# Patient Record
Sex: MALE | Race: WHITE | NOT HISPANIC OR LATINO | URBAN - METROPOLITAN AREA
[De-identification: names, ages, dates, MRNs, and addresses within clinical notes are randomized per-mention and may not be internally consistent; named-entity substitution may affect disease eponyms.]

---

## 2022-07-20 ENCOUNTER — HOSPITAL ENCOUNTER (EMERGENCY)
Facility: HOSPITAL | Age: 52
Discharge: HOME | End: 2022-07-20
Attending: EMERGENCY MEDICINE
Payer: COMMERCIAL

## 2022-07-20 VITALS
HEART RATE: 82 BPM | DIASTOLIC BLOOD PRESSURE: 68 MMHG | HEIGHT: 72 IN | OXYGEN SATURATION: 92 % | SYSTOLIC BLOOD PRESSURE: 125 MMHG | TEMPERATURE: 97.6 F | RESPIRATION RATE: 16 BRPM | WEIGHT: 190 LBS | BODY MASS INDEX: 25.73 KG/M2

## 2022-07-20 DIAGNOSIS — T40.1X1A ACCIDENTAL OVERDOSE OF HEROIN, INITIAL ENCOUNTER (HCC): Primary | ICD-10-CM

## 2022-07-20 LAB
ATRIAL RATE: 81 BPM
P AXIS: 81 DEGREES
PR INTERVAL: 156 MS
QRS AXIS: 72 DEGREES
QRSD INTERVAL: 90 MS
QT INTERVAL: 374 MS
QTC INTERVAL: 434 MS
T WAVE AXIS: 65 DEGREES
VENTRICULAR RATE: 81 BPM

## 2022-07-20 PROCEDURE — 93010 ELECTROCARDIOGRAM REPORT: CPT | Performed by: INTERNAL MEDICINE

## 2022-07-20 PROCEDURE — 93005 ELECTROCARDIOGRAM TRACING: CPT

## 2022-07-20 PROCEDURE — 99284 EMERGENCY DEPT VISIT MOD MDM: CPT

## 2022-07-20 PROCEDURE — 99284 EMERGENCY DEPT VISIT MOD MDM: CPT | Performed by: EMERGENCY MEDICINE

## 2022-07-20 RX ADMIN — NALOXONE HYDROCHLORIDE 4 MG: 4 SPRAY NASAL at 19:35

## 2022-07-20 NOTE — ED ATTENDING ATTESTATION
7/20/2022  Moisés Pedraza DO, saw and evaluated the patient  I have discussed the patient with the resident/non-physician practitioner and agree with the resident's/non-physician practitioner's findings, Plan of Care, and MDM as documented in the resident's/non-physician practitioner's note, except where noted  All available labs and Radiology studies were reviewed  I was present for key portions of any procedure(s) performed by the resident/non-physician practitioner and I was immediately available to provide assistance  At this point I agree with the current assessment done in the Emergency Department  I have conducted an independent evaluation of this patient a history and physical is as follows:    Patient presents via EMS after being found unresponsive at home by his family  He evidently had snorted heroin for which his family administered 4 mg of Narcan with reversal of his unresponsiveness  He was initially mildly hypoxic at 88% on room air for which he was given supplemental oxygen  He arrived awake, alert and oriented, just amnestic to the events after snorting the heroin  At the time of my interview, he had taken off the nasal cannula and was still saturating in the mid 90s on room air  His wife is present in the room and will accept replenishment Narcan  He initially declined it, stating this was the 1st time that he used an excessive amount her when or required Narcan  He does not seek rehab at this time, thinking he can overcome it himself  ROS: Denies preceding or contributing HA, syncope, neck or back pain, CP, SOB, abdominal pain  12 system ROS o/w negative       PE: NAD, appears comfortable, awake, cooperative, no external trauma; PERRL, EOMI; MMM, no posterior oropharyngeal exudate, edema or erythema; no midline vertebral TTP; HRR, no murmur, monitor shows sinus rhythm at 94 bpm; lungs CTA w/o w/r/r, POx 97% on RA (nl); abdomen s/nt/nd, nl BS in all 4 quadrants; (-) LE edema, FROM extremities x4, strength and sensation appear intact; skin p/w/d, no visible track marks; CN II-XII GI/NF, oriented  DDx:  Snorted heroin overdose w/o evidence of injury or persistent medical emergency  A/P: Will check EKG, monitor for worsening condition, dispense community Narcan package, reevaluate for further workup and disposition      ED Course         Critical Care Time  Procedures

## 2022-07-20 NOTE — ED PROVIDER NOTES
History  Chief Complaint   Patient presents with    Overdose - Accidental     Pt arrives via EMS from home, found unresponsive by family, given 4mg narcan, A&Ox4  Denies complaints  Patient is a 49-year-old male who presents following a heroin overdose  Patient says he snorted heroin at home and overdosed  Her patient and nursing notes patient was found by family  Patient was given 4 mg of Narcan and return to baseline  Patient states he started recreational drug use a few months ago when his injuries by friend, says he has snorted heroin, smoked marijuana but no other drug use, no IV drug use  He denies any symptoms at this time  No chest pain, shortness a breath, palpitations, fevers, chills  Patient denies rehab at this time  Patient states he will never do recreational drugs again  None       History reviewed  No pertinent past medical history  History reviewed  No pertinent surgical history  History reviewed  No pertinent family history  I have reviewed and agree with the history as documented  E-Cigarette/Vaping    E-Cigarette Use Never User      E-Cigarette/Vaping Substances     Social History     Tobacco Use    Smoking status: Current Every Day Smoker     Types: Cigarettes    Smokeless tobacco: Never Used   Vaping Use    Vaping Use: Never used   Substance Use Topics    Alcohol use: Never    Drug use: Yes     Types: Heroin        Review of Systems   Constitutional: Negative for chills and fever  HENT: Negative for ear pain and sore throat  Eyes: Negative for pain and visual disturbance  Respiratory: Negative for cough and shortness of breath  Cardiovascular: Negative for chest pain and palpitations  Gastrointestinal: Negative for abdominal pain and vomiting  Genitourinary: Negative for dysuria and hematuria  Musculoskeletal: Negative for arthralgias and back pain  Skin: Negative for color change and rash     Neurological: Negative for syncope, weakness and headaches  Psychiatric/Behavioral: Negative for behavioral problems, decreased concentration, hallucinations, self-injury and suicidal ideas  The patient is not nervous/anxious  All other systems reviewed and are negative  Physical Exam  ED Triage Vitals [07/20/22 1802]   Temperature Pulse Respirations Blood Pressure SpO2   97 6 °F (36 4 °C) 94 16 148/88 (!) 88 %      Temp Source Heart Rate Source Patient Position - Orthostatic VS BP Location FiO2 (%)   Tympanic Monitor Sitting Left arm --      Pain Score       --             Orthostatic Vital Signs  Vitals:    07/20/22 1802 07/20/22 1845 07/20/22 1900   BP: 148/88  125/68   Pulse: 94 84 82   Patient Position - Orthostatic VS: Sitting  Sitting       Physical Exam  Vitals and nursing note reviewed  Constitutional:       General: He is not in acute distress  Appearance: Normal appearance  He is well-developed and normal weight  He is not ill-appearing, toxic-appearing or diaphoretic  HENT:      Head: Normocephalic and atraumatic  Right Ear: External ear normal       Left Ear: External ear normal    Eyes:      Extraocular Movements: Extraocular movements intact  Conjunctiva/sclera: Conjunctivae normal       Pupils: Pupils are equal, round, and reactive to light  Cardiovascular:      Rate and Rhythm: Normal rate and regular rhythm  Pulses: Normal pulses  Heart sounds: Normal heart sounds  No murmur heard  No friction rub  No gallop  Pulmonary:      Effort: Pulmonary effort is normal  No respiratory distress  Breath sounds: Normal breath sounds  No wheezing  Abdominal:      General: Abdomen is flat  Bowel sounds are normal       Palpations: Abdomen is soft  Tenderness: There is no abdominal tenderness  There is no guarding  Skin:     General: Skin is warm and dry  Neurological:      General: No focal deficit present  Mental Status: He is alert and oriented to person, place, and time        Cranial Nerves: No cranial nerve deficit  Sensory: No sensory deficit  Motor: No weakness  Psychiatric:         Mood and Affect: Mood normal          Behavior: Behavior normal          Thought Content: Thought content normal          Judgment: Judgment normal          ED Medications  Medications   naloxone nasal- Given to patient by provider at discharge  (NARCAN) 4 mg/0 1 mL nasal spray 4 mg (4 mg Does not apply Given by Other 7/20/22 1935)       Diagnostic Studies  Results Reviewed     None                 No orders to display         Procedures  Procedures      ED Course  ED Course as of 07/20/22 2135 Wed Jul 20, 2022 1857 Patient is satting 93% on room air, denies shortness of breath  Work of breathing normal    1909 Procedure Note: EKG  Date/Time: 07/20/22 7:10 PM   Interpreted by: Ashley Ramirez   Indications / Diagnosis: heroin overdose  ECG reviewed by me, the ED Provider: yes   The EKG demonstrates:  Rhythm: normal sinus  Intervals: normal intervals  Axis: normal axis  QRS/Blocks: normal QRS  ST Changes: No acute ST Changes, no STD/MARION       1916 Patient is awake, alert, satting 95% on room air, no shortness of breath  Will discharge home with Narcan  MDM  Number of Diagnoses or Management Options  Accidental overdose of heroin, initial encounter [facility]): new and does not require workup  Diagnosis management comments: Patient is a 55-year-old male who presents following a heroin overdose  Patient feels well now, no complaints  There is no concern for multi drug intoxication at this time  Will monitor patient for 1 hour, if clinically stable and unchanged will discharge with Narcan         Amount and/or Complexity of Data Reviewed  Tests in the medicine section of CPT®: reviewed    Patient Progress  Patient progress: improved      Disposition  Final diagnoses:   Accidental overdose of heroin, initial encounter [facility])     Time reflects when diagnosis was documented in both MDM as applicable and the Disposition within this note     Time User Action Codes Description Comment    7/20/2022  7:20 PM Elizabeth Masoud Add [[id number] [id number]] Accidental overdose of heroin, initial encounter [facility])       ED Disposition     ED Disposition   Discharge    Condition   Stable    Date/Time   Wed Jul 20, 2022  7:20 PM    [address] discharge to home/self care  Follow-up Information     Follow up With Specialties Details Why Contact Info Additional Information    [address] [place] [address] Emergency Department Emergency Medicine   [facility] [id number]  [address] Emergency Department, [address] [place], [place], [id number] [phone]          There are no discharge medications for this patient  No discharge procedures on file  PDMP Review     None           ED Provider  Attending physically available and evaluated Ethan Desouza I managed the patient along with the ED Attending      Electronically Signed by         Betty Pablo MD  07/20/22 2023

## 2022-09-28 ENCOUNTER — HOSPITAL ENCOUNTER (EMERGENCY)
Facility: HOSPITAL | Age: 52
Discharge: HOME | End: 2022-09-29
Attending: EMERGENCY MEDICINE
Payer: COMMERCIAL

## 2022-09-28 VITALS
RESPIRATION RATE: 18 BRPM | HEART RATE: 90 BPM | SYSTOLIC BLOOD PRESSURE: 167 MMHG | TEMPERATURE: 97.9 F | OXYGEN SATURATION: 97 % | DIASTOLIC BLOOD PRESSURE: 97 MMHG

## 2022-09-28 DIAGNOSIS — T50.901A ACCIDENTAL DRUG OVERDOSE, INITIAL ENCOUNTER: Primary | ICD-10-CM

## 2022-09-28 PROCEDURE — 99284 EMERGENCY DEPT VISIT MOD MDM: CPT

## 2022-09-28 PROCEDURE — 99284 EMERGENCY DEPT VISIT MOD MDM: CPT | Performed by: EMERGENCY MEDICINE

## 2022-09-29 NOTE — ED PROVIDER NOTES
History  Chief Complaint   Patient presents with    Overdose - Accidental     Pt snorted a bag of what he thought was cocaine & it was heroine, family found him unresponsive on the floor, police gave him 8mg narcan      Patient is a 53-year-old male with history of polysubstance abuse who presents to the ED for evaluation of accidental overdose at 8 p m  Patient reports he was snorting when he thought was a bag of cocaine but he bought it from someone he notes not normally buy from and he does not know exactly what it was but suspects it was heroin  Per EMS, patient was found on the floor by his family who called 911  Police arrived and gave him 2 doses of 4 mg Narcan intranasally with improvement on route to the ED  Patient reports his last cocaine use was 2 weeks ago  He states he is feeling well now and has no complaints or concerns at this time  He denies any chest pain, shortness of breath, headache, changes in vision hearing, back pain, neck pain, extremity pain, or other injuries or concerns  None       History reviewed  No pertinent past medical history  History reviewed  No pertinent surgical history  History reviewed  No pertinent family history  I have reviewed and agree with the history as documented  E-Cigarette/Vaping    E-Cigarette Use Never User      E-Cigarette/Vaping Substances     Social History     Tobacco Use    Smoking status: Current Every Day Smoker     Packs/day: 1 00     Types: Cigarettes    Smokeless tobacco: Never Used   Vaping Use    Vaping Use: Never used   Substance Use Topics    Alcohol use: Never    Drug use: Yes     Types: Heroin, Cocaine        Review of Systems   Constitutional: Negative for chills and fever  HENT: Negative for ear pain and sore throat  Eyes: Negative for pain and visual disturbance  Respiratory: Negative for cough and shortness of breath  Cardiovascular: Negative for chest pain and palpitations     Gastrointestinal: Negative for abdominal pain and vomiting  Genitourinary: Negative for dysuria and hematuria  Musculoskeletal: Negative for arthralgias and back pain  Skin: Negative for color change and rash  Neurological: Negative for seizures and headaches  All other systems reviewed and are negative  Physical Exam  ED Triage Vitals [09/28/22 2153]   Temperature Pulse Respirations Blood Pressure SpO2   97 9 °F (36 6 °C) 90 18 167/97 97 %      Temp Source Heart Rate Source Patient Position - Orthostatic VS BP Location FiO2 (%)   Oral Monitor Lying Right arm --      Pain Score       --             Orthostatic Vital Signs  Vitals:    09/28/22 2153   BP: 167/97   Pulse: 90   Patient Position - Orthostatic VS: Lying       Physical Exam  Vitals and nursing note reviewed  Constitutional:       General: He is not in acute distress  Appearance: Normal appearance  He is well-developed  He is not ill-appearing or diaphoretic  HENT:      Head: Normocephalic and atraumatic  Mouth/Throat:      Mouth: Mucous membranes are moist       Pharynx: Oropharynx is clear  Eyes:      Extraocular Movements: Extraocular movements intact  Conjunctiva/sclera: Conjunctivae normal       Pupils: Pupils are equal, round, and reactive to light  Comments: Pupils are 4 mm bilaterally   Cardiovascular:      Rate and Rhythm: Normal rate and regular rhythm  Pulses: Normal pulses  Heart sounds: Normal heart sounds  No murmur heard  Pulmonary:      Effort: Pulmonary effort is normal  No respiratory distress  Breath sounds: Normal breath sounds  Abdominal:      Palpations: Abdomen is soft  Tenderness: There is no abdominal tenderness  There is no guarding or rebound  Musculoskeletal:         General: No swelling or signs of injury  Normal range of motion  Cervical back: Normal range of motion and neck supple  Skin:     General: Skin is warm and dry        Capillary Refill: Capillary refill takes less than 2 seconds  Neurological:      General: No focal deficit present  Mental Status: He is alert and oriented to person, place, and time  Mental status is at baseline  Psychiatric:         Mood and Affect: Mood normal          Behavior: Behavior normal          ED Medications  Medications - No data to display    Diagnostic Studies  Results Reviewed     None                 No orders to display         Procedures  Procedures      ED Course  ED Course as of 09/29/22 0250   Wed Sep 28, 2022   2310 Upon re-evaluation, patient is resting comfortably, vital signs are stable on the monitor  Patient has 3 family members at bedside  Denies any new or worsening complaints at this time  Physical exam is unchanged compared to initial   Patient family states they are comfortable with discharge home after period of observation in the ED  All questions answered  2348 Re-evaluated at bedside, remains stable no decompensation  No additional intervention required  Vital signes are stable on the monitor  Patient is requesting discharge and reports he will follow up outpatient  MDM  Number of Diagnoses or Management Options  Diagnosis management comments: Patient is a 78-year-old male with history of polysubstance abuse who presents to the ED for evaluation of accidental overdose at 8 p m  Patient is denying any complaints or concerns at this time  Due to administration of intranasal Narcan in the field, will observe patient to ensure no need for additional Narcan administration in the ED  See ED course for additional details  Patient re-evaluated multiple times during stay in the ED  Discussed findings and plan with patient and family at bedside  Advised on need for outpatient follow up, given information  Given return precautions verbally and in discharge instructions    Instructed patient to discontinue use of all questionable substances and substances in general   All questions answered  Patient expressed verbal understanding and is agreeable with plan for discharge with outpatient follow up  Patient's family reports they will transport patient home and keep a close eye on him  Disposition  Final diagnoses:   Accidental drug overdose, initial encounter     Time reflects when diagnosis was documented in both MDM as applicable and the Disposition within this note     Time User Action Codes Description Comment    9/28/2022 11:41 PM Cathy Campbell Add [[id number]] Accidental drug overdose, initial encounter       ED Disposition     ED Disposition   Discharge    Condition   Stable    Date/Time   Wed Sep 28, 2022 11:41 PM    Comment   Sanket Garcia discharge to home/self/family care  Follow-up Information     Follow up With Specialties Details Why Contact Info Additional [facility]   [address] [place] [id number]  [place] [address], [address] [facility]    [phone]             There are no discharge medications for this patient  No discharge procedures on file  PDMP Review     None           ED Provider  Attending physically available and evaluated Sanket Garcia I managed the patient along with the ED Attending      Electronically Signed by         Kayden Petty DO  09/29/22 7486

## 2022-09-29 NOTE — DISCHARGE INSTRUCTIONS
Please follow up with your PCP as needed, consider additional treatment  Please return to the Emergency Department if you experience worsening of your current symptoms, difficulty breathing, symptoms of overdose, chest pain, or any other concerning symptoms

## 2022-09-30 NOTE — ED ATTENDING ATTESTATION
9/28/2022  IRitesh DO, saw and evaluated the patient  I have discussed the patient with the resident/non-physician practitioner and agree with the resident's/non-physician practitioner's findings, Plan of Care, and MDM as documented in the resident's/non-physician practitioner's note, except where noted  All available labs and Radiology studies were reviewed  I was present for key portions of any procedure(s) performed by the resident/non-physician practitioner and I was immediately available to provide assistance  At this point I agree with the current assessment done in the Emergency Department  I have conducted an independent evaluation of this patient a history and physical is as follows:    57-year-old male presents after accidental overdose at 8:00 p m  [place] [employer] Patient reports he thought he was snorting cocaine but bought from someone he does not know  Unsure what the substance was but suspects was heroin  Per EMS patient was found on the floor and family called 911  Upon police arrival they gave him 2 doses of 4 mg of Narcan intranasally with improvement  Patient currently denies complaints  On exam-no acute distress, heart regular, no respiratory distress  Plan-advised patient to stop using drugs    Will observe in the ED    ED Course         Critical Care Time  Procedures

## 2022-11-25 ENCOUNTER — HOSPITAL ENCOUNTER (EMERGENCY)
Facility: HOSPITAL | Age: 52
Discharge: HOME | End: 2022-11-25
Attending: EMERGENCY MEDICINE

## 2022-11-25 ENCOUNTER — APPOINTMENT (EMERGENCY)
Dept: RADIOLOGY | Facility: HOSPITAL | Age: 52
End: 2022-11-25

## 2022-11-25 VITALS
DIASTOLIC BLOOD PRESSURE: 104 MMHG | HEART RATE: 71 BPM | SYSTOLIC BLOOD PRESSURE: 165 MMHG | TEMPERATURE: 98.3 F | OXYGEN SATURATION: 97 % | RESPIRATION RATE: 20 BRPM

## 2022-11-25 DIAGNOSIS — T68.XXXA HYPOTHERMIA, INITIAL ENCOUNTER: ICD-10-CM

## 2022-11-25 DIAGNOSIS — T50.901A ACCIDENTAL DRUG OVERDOSE, INITIAL ENCOUNTER: Primary | ICD-10-CM

## 2022-11-25 LAB
ALBUMIN SERPL BCP-MCNC: 3.6 G/DL (ref 3.5–5)
ALP SERPL-CCNC: 94 U/L (ref 46–116)
ALT SERPL W P-5'-P-CCNC: 26 U/L (ref 12–78)
ANION GAP SERPL CALCULATED.3IONS-SCNC: 6 MMOL/L (ref 4–13)
AST SERPL W P-5'-P-CCNC: 32 U/L (ref 5–45)
BASOPHILS # BLD AUTO: 0.04 THOUSANDS/ÂΜL (ref 0–0.1)
BASOPHILS NFR BLD AUTO: 0 % (ref 0–1)
BILIRUB SERPL-MCNC: 0.37 MG/DL (ref 0.2–1)
BUN SERPL-MCNC: 17 MG/DL (ref 5–25)
CALCIUM SERPL-MCNC: 9 MG/DL (ref 8.3–10.1)
CHLORIDE SERPL-SCNC: 105 MMOL/L (ref 96–108)
CK MB SERPL-MCNC: 2.4 % (ref 0–2.5)
CK MB SERPL-MCNC: 5.4 NG/ML (ref 0–5)
CK SERPL-CCNC: 222 U/L (ref 39–308)
CO2 SERPL-SCNC: 24 MMOL/L (ref 21–32)
CREAT SERPL-MCNC: 1.2 MG/DL (ref 0.6–1.3)
EOSINOPHIL # BLD AUTO: 0.06 THOUSAND/ÂΜL (ref 0–0.61)
EOSINOPHIL NFR BLD AUTO: 1 % (ref 0–6)
ERYTHROCYTE [DISTWIDTH] IN BLOOD BY AUTOMATED COUNT: 11.8 % (ref 11.6–15.1)
GFR SERPL CREATININE-BSD FRML MDRD: 68 ML/MIN/1.73SQ M
GLUCOSE SERPL-MCNC: 118 MG/DL (ref 65–140)
HCT VFR BLD AUTO: 44.3 % (ref 36.5–49.3)
HGB BLD-MCNC: 14.6 G/DL (ref 12–17)
IMM GRANULOCYTES # BLD AUTO: 0.07 THOUSAND/UL (ref 0–0.2)
IMM GRANULOCYTES NFR BLD AUTO: 1 % (ref 0–2)
LYMPHOCYTES # BLD AUTO: 1.4 THOUSANDS/ÂΜL (ref 0.6–4.47)
LYMPHOCYTES NFR BLD AUTO: 12 % (ref 14–44)
MCH RBC QN AUTO: 31.8 PG (ref 26.8–34.3)
MCHC RBC AUTO-ENTMCNC: 33 G/DL (ref 31.4–37.4)
MCV RBC AUTO: 97 FL (ref 82–98)
MONOCYTES # BLD AUTO: 0.74 THOUSAND/ÂΜL (ref 0.17–1.22)
MONOCYTES NFR BLD AUTO: 6 % (ref 4–12)
NEUTROPHILS # BLD AUTO: 9.78 THOUSANDS/ÂΜL (ref 1.85–7.62)
NEUTS SEG NFR BLD AUTO: 80 % (ref 43–75)
NRBC BLD AUTO-RTO: 0 /100 WBCS
PLATELET # BLD AUTO: 353 THOUSANDS/UL (ref 149–390)
PMV BLD AUTO: 9.4 FL (ref 8.9–12.7)
POTASSIUM SERPL-SCNC: 4.4 MMOL/L (ref 3.5–5.3)
PROT SERPL-MCNC: 8.5 G/DL (ref 6.4–8.4)
RBC # BLD AUTO: 4.59 MILLION/UL (ref 3.88–5.62)
SODIUM SERPL-SCNC: 135 MMOL/L (ref 135–147)
WBC # BLD AUTO: 12.09 THOUSAND/UL (ref 4.31–10.16)

## 2022-11-25 RX ADMIN — NALOXONE HYDROCHLORIDE 4 MG: 4 SPRAY NASAL at 20:56

## 2022-11-25 NOTE — ED ATTENDING ATTESTATION
11/25/2022  IKeyanna MD, saw and evaluated the patient  I have discussed the patient with the resident/non-physician practitioner and agree with the resident's/non-physician practitioner's findings, Plan of Care, and MDM as documented in the resident's/non-physician practitioner's note, except where noted  All available labs and Radiology studies were reviewed  I was present for key portions of any procedure(s) performed by the resident/non-physician practitioner and I was immediately available to provide assistance  At this point I agree with the current assessment done in the Emergency Department    I have conducted an independent evaluation of this patient a history and physical is as follows:  Pt ahs history of substance abuse Pt received narcan in field and is now at baseline Pt was at home and found in driveway where he received narcan PE: alert abrasion noted to L side of face neck nontender heart reg lungs coarse sounds bilat with few wheezes  abd soft nontender ext nad MDM: will rewarm check labs ct head  ED Course         Critical Care Time  Procedures

## 2022-11-25 NOTE — ED PROVIDER NOTES
History  Chief Complaint   Patient presents with   • Overdose - Accidental     PT was found unresponsive by family at home  Family administered narcan prior to EMS arrival  PT is alert upon EMS arrival       Patient is a 17-year-old male with a significant past medical history of polysubstance abuse, currently presenting after a suspected drug overdose  As per patient he came home from work, unclear what time but he usually does work at approximately 3:00 p m , and according to the patient was found by his wife passed out in his driveway  According to report, patient was given Narcan and responded  Currently, the patient is denying any acute complaints  He does not remember using drugs, but does admit to both heroin as well as cocaine use, both of which he snorts  He denies any alcohol use  He specifically denies any headaches, changes in vision, changes in sensation, focal weakness, chest pain, difficulty breathing, abdominal pain, neck/back pain, or trauma anywhere  He is unsure if he is interested in rehabilitation and would like to speak to his wife further  None       No past medical history on file  No past surgical history on file  No family history on file  I have reviewed and agree with the history as documented  E-Cigarette/Vaping   • E-Cigarette Use Never User      E-Cigarette/Vaping Substances     Social History     Tobacco Use   • Smoking status: Every Day     Packs/day: 1 00     Types: Cigarettes   • Smokeless tobacco: Never   Vaping Use   • Vaping Use: Never used   Substance Use Topics   • Alcohol use: Never   • Drug use: Yes     Types: Heroin, Cocaine        Review of Systems   Constitutional: Negative for chills and fever  HENT: Negative for sore throat  Eyes: Negative for visual disturbance  Respiratory: Negative for cough and shortness of breath  Cardiovascular: Negative for chest pain and leg swelling     Gastrointestinal: Negative for abdominal pain, constipation, diarrhea, nausea and vomiting  Genitourinary: Negative for dysuria  Musculoskeletal: Negative for back pain and neck pain  Skin: Negative for rash  Neurological: Negative for dizziness, syncope, light-headedness and headaches  Psychiatric/Behavioral: Negative for agitation  All other systems reviewed and are negative  Physical Exam  ED Triage Vitals [11/25/22 1840]   Temperature Pulse Respirations Blood Pressure SpO2   (!) 93 6 °F (34 2 °C) 71 20 (!) 165/104 97 %      Temp Source Heart Rate Source Patient Position - Orthostatic VS BP Location FiO2 (%)   Rectal Monitor Sitting Right arm --      Pain Score       --             Orthostatic Vital Signs  Vitals:    11/25/22 1840   BP: (!) 165/104   Pulse: 71   Patient Position - Orthostatic VS: Sitting       Physical Exam  Vitals and nursing note reviewed  Constitutional:       General: He is not in acute distress  Appearance: Normal appearance  He is not ill-appearing  Comments: Shivering, cold to touch   HENT:      Head: Normocephalic  Comments: Superficial abrasion of left cheek  No tenderness in area  Sensation intact  Right Ear: External ear normal       Left Ear: External ear normal       Nose: Nose normal       Mouth/Throat:      Mouth: Mucous membranes are moist    Eyes:      General: No visual field deficit or scleral icterus  Right eye: No discharge  Left eye: No discharge  Extraocular Movements: Extraocular movements intact  Conjunctiva/sclera: Conjunctivae normal       Pupils: Pupils are equal, round, and reactive to light  Cardiovascular:      Rate and Rhythm: Normal rate and regular rhythm  Pulses: Normal pulses  Heart sounds: Normal heart sounds  No murmur heard  No friction rub  No gallop  Pulmonary:      Effort: Pulmonary effort is normal  No respiratory distress  Breath sounds: Normal breath sounds  No wheezing, rhonchi or rales     Abdominal:      General: Abdomen is flat  There is no distension  Palpations: Abdomen is soft  There is no mass  Tenderness: There is no abdominal tenderness  Genitourinary:     Comments: Deferred  Musculoskeletal:         General: Normal range of motion  Cervical back: Normal range of motion  No rigidity or tenderness  Right lower leg: No edema  Left lower leg: No edema  Comments: No evidence of trauma elsewhere  Compartments soft throughout body  Skin:     General: Skin is warm and dry  Neurological:      General: No focal deficit present  Mental Status: He is alert and oriented to person, place, and time  GCS: GCS eye subscore is 4  GCS verbal subscore is 5  GCS motor subscore is 6  Cranial Nerves: Cranial nerves are intact  No cranial nerve deficit, dysarthria or facial asymmetry  Sensory: Sensation is intact  No sensory deficit  Motor: Motor function is intact  No pronator drift  Coordination: Coordination is intact  Finger-Nose-Finger Test and Heel to [facility] Test normal    Psychiatric:         Mood and Affect: Mood normal          ED Medications  Medications   naloxone nasal- Given to patient by provider at discharge   (NARCAN) 4 mg/0 1 mL nasal spray 4 mg (4 mg Does not apply Given by Other 11/25/22 2056)       Diagnostic Studies  Results Reviewed     Procedure Component Value Units Date/Time    CBC and differential [[id number]]  (Abnormal) Collected: 11/25/22 1946    Lab Status: Final result Specimen: Blood from Arm, Left Updated: 11/25/22 2001     WBC 12 09 Thousand/uL      RBC 4 59 Million/uL      Hemoglobin 14 6 g/dL      Hematocrit 44 3 %      MCV 97 fL      MCH 31 8 pg      MCHC 33 0 g/dL      RDW 11 8 %      MPV 9 4 fL      Platelets 926 Thousands/uL      nRBC 0 /100 WBCs      Neutrophils Relative 80 %      Immat GRANS % 1 %      Lymphocytes Relative 12 %      Monocytes Relative 6 %      Eosinophils Relative 1 %      Basophils Relative 0 %      Neutrophils Absolute 9 78 Thousands/µL      Immature Grans Absolute 0 07 Thousand/uL      Lymphocytes Absolute 1 40 Thousands/µL      Monocytes Absolute 0 74 Thousand/µL      Eosinophils Absolute 0 06 Thousand/µL      Basophils Absolute 0 04 Thousands/µL     CKMB [[id number]]  (Abnormal) Collected: 11/25/22 1853    Lab Status: Final result Specimen: Blood from Arm, Right Updated: 11/25/22 1945     CK-MB Index 2 4 %      CK-MB 5 4 ng/mL     Comprehensive metabolic panel [[id number]]  (Abnormal) Collected: 11/25/22 1853    Lab Status: Final result Specimen: Blood from Arm, Right Updated: 11/25/22 1929     Sodium 135 mmol/L      Potassium 4 4 mmol/L      Chloride 105 mmol/L      CO2 24 mmol/L      ANION GAP 6 mmol/L      BUN 17 mg/dL      Creatinine 1 20 mg/dL      Glucose 118 mg/dL      Calcium 9 0 mg/dL      AST 32 U/L      ALT 26 U/L      Alkaline Phosphatase 94 U/L      Total Protein 8 5 g/dL      Albumin 3 6 g/dL      Total Bilirubin 0 37 mg/dL      eGFR 68 ml/min/1 73sq m     Narrative:      [place] guidelines for Chronic Kidney Disease (CKD):   •  Stage 1 with normal or high GFR (GFR > 90 mL/min/1 73 square meters)  •  Stage 2 Mild CKD (GFR = 60-89 mL/min/1 73 square meters)  •  Stage 3A Moderate CKD (GFR = 45-59 mL/min/1 73 square meters)  •  Stage 3B Moderate CKD (GFR = 30-44 mL/min/1 73 square meters)  •  Stage 4 Severe CKD (GFR = 15-29 mL/min/1 73 square meters)  •  Stage 5 End Stage CKD (GFR <15 mL/min/1 73 square meters)  Note: GFR calculation is accurate only with a steady state creatinine    CK (with reflex to MB) [[id number]]  (Normal) Collected: 11/25/22 1853    Lab Status: Final result Specimen: Blood from Arm, Right Updated: 11/25/22 1929     Total  U/L                  CT head without contrast   Final Result by Deepthi Domínguez MD (11/25 1936)      No acute intracranial abnormality  Sinus disease                 Workstation performed: [id number]         XR chest 1 view portable (Results Pending)         Procedures  ECG 12 Lead Documentation Only    Date/Time: 11/25/2022 8:25 PM  Performed by: Alex Gonzalez DO  Authorized by: Alex Gonzalez DO     Indications / Diagnosis:  Hypothermia  ECG reviewed by me, the ED Provider: yes    Patient location:  ED  Previous ECG:     Previous ECG:  Compared to current    Similarity:  No change  Interpretation:     Interpretation: normal    Rate:     ECG rate:  87    ECG rate assessment: normal    Rhythm:     Rhythm: sinus rhythm    Ectopy:     Ectopy: none    QRS:     QRS axis:  Normal  Conduction:     Conduction: normal    ST segments:     ST segments:  Normal  T waves:     T waves: normal            ED Course  ED Course as of 11/26/22 0011   Fri Nov 25, 2022 2015 Patient requesting outpatient rehab resources  Crisis to see  MDM  Number of Diagnoses or Management Options  Accidental drug overdose, initial encounter: new and requires workup  Hypothermia, initial encounter: new and requires workup  Diagnosis management comments: Patient is a 45year old male presenting with a suspected drug overdose  Impression is drug overdose  Given abrasion of left cheek and intoxication/amnesia towards event, concern for acute intracranial pathology  Patient also notably hypothermic due to environment  Plan: labs, CTH, bear hugger    Labs largely normal  CTH negative  Patient temperature improved with bear hugger  On reassessment, patient continue to be at baseline as per himself and family who is bedside  Offered patient rehab/resources, to which the patient agreed to speak with crisis counselor, but would prefer outpatient resources, which he was given  Patient given community narcan which was given by myself to the patient son  Recommended patient stop illicit drug use  Patient seems to understand this plan and is agreeable  All questions answered  Patient discharged home with return precautions         Amount and/or Complexity of Data Reviewed  Clinical lab tests: ordered and reviewed  Tests in the radiology section of CPT®: ordered and reviewed  Review and summarize past medical records: yes  Independent visualization of images, tracings, or specimens: yes    Patient Progress  Patient progress: improved      Disposition  Final diagnoses:   Accidental drug overdose, initial encounter   Hypothermia, initial encounter     Time reflects when diagnosis was documented in both MDM as applicable and the Disposition within this note     Time User Action Codes Description Comment    11/25/2022  7:55  N  [address] Accidental drug overdose, initial encounter     11/26/2022 12:07 AM Rodneyia, [address] [place] [address],[address]  XXXA] Hypothermia, initial encounter       ED Disposition     ED Disposition   Discharge    Condition   Stable    Date/Time   Fri Nov 25, 2022  8:49 PM    [address] discharge to home/self care  Follow-up Information    None         There are no discharge medications for this patient  No discharge procedures on file  PDMP Review     None           ED Provider  Attending physically available and evaluated Jeanna Kelsey I managed the patient along with the ED Attending      Electronically Signed by         Margy Whalen DO  11/26/22 0011

## 2022-11-26 LAB
ATRIAL RATE: 69 BPM
ATRIAL RATE: 85 BPM
QRS AXIS: 73 DEGREES
QRS AXIS: 75 DEGREES
QRSD INTERVAL: 102 MS
QRSD INTERVAL: 92 MS
QT INTERVAL: 358 MS
QT INTERVAL: 362 MS
QTC INTERVAL: 417 MS
QTC INTERVAL: 426 MS
T WAVE AXIS: 67 DEGREES
T WAVE AXIS: 67 DEGREES
VENTRICULAR RATE: 80 BPM
VENTRICULAR RATE: 85 BPM

## 2022-11-26 NOTE — DISCHARGE INSTRUCTIONS
You were seen in the ED today with an accidental drug overdose  We did not find an emergent cause of your presentation and feel safe discharging you home  We gave you narcan  Please give this to someone who can administer if you overdose again  Please stop using illicit drugs  I gave you information for rehab if you would like  Please return to the ED if you have any worsening symptoms or any other concerns

## 2022-11-26 NOTE — ED NOTES
XR at bedside     Ewa Zhou, [facility], [facility]  11/25/22 1450

## 2022-11-27 ENCOUNTER — HOSPITAL ENCOUNTER (EMERGENCY)
Facility: HOSPITAL | Age: 52
Discharge: HOME | End: 2022-11-27
Attending: EMERGENCY MEDICINE

## 2022-11-27 VITALS
TEMPERATURE: 97.6 F | DIASTOLIC BLOOD PRESSURE: 70 MMHG | OXYGEN SATURATION: 94 % | HEART RATE: 76 BPM | SYSTOLIC BLOOD PRESSURE: 134 MMHG | RESPIRATION RATE: 14 BRPM

## 2022-11-27 DIAGNOSIS — T40.1X1A: Primary | ICD-10-CM

## 2022-11-27 LAB
ETHANOL EXG-MCNC: 0 MG/DL
FLUAV RNA RESP QL NAA+PROBE: NEGATIVE
FLUBV RNA RESP QL NAA+PROBE: NEGATIVE
RSV RNA RESP QL NAA+PROBE: NEGATIVE
SARS-COV-2 RNA RESP QL NAA+PROBE: NEGATIVE

## 2022-11-27 RX ORDER — NALOXONE HYDROCHLORIDE 0.4 MG/ML
0.5 INJECTION, SOLUTION INTRAMUSCULAR; INTRAVENOUS; SUBCUTANEOUS ONCE
Status: DISCONTINUED | OUTPATIENT
Start: 2022-11-27 | End: 2022-11-27 | Stop reason: HOSPADM

## 2022-11-27 RX ADMIN — NALOXONE HYDROCHLORIDE 4 MG: 4 SPRAY NASAL at 09:31

## 2022-11-27 NOTE — ED PROVIDER NOTES
History  Chief Complaint   Patient presents with   • Heroin Overdose - Accidental     Per EMS pt snorted heroin this morning and became unresponsive  Given 2 doses of intranasal narcan PTA  On arrival pt AAOx4 in no distress     49-year-old man with history of heroin use disorder presents after accidental overdose  He reported snorting a "pinch" of heroin this morning at 800  His wife found him and called 911  She did not use narcan  EMS arrived, and he received 2 doses of intranasal narcan  Patient quickly returned to consciousness and on arrival was AAAOx4 with no complaints  He denies any other drug use  He reportedly has rehab scheduled this week at a facility in [place]  He denies trying to end his life  Past medical history:  Opioid use disorder  Past surgical history:  None  Medications:  None          None       History reviewed  No pertinent past medical history  History reviewed  No pertinent surgical history  History reviewed  No pertinent family history  I have reviewed and agree with the history as documented  E-Cigarette/Vaping   • E-Cigarette Use Never User      E-Cigarette/Vaping Substances     Social History     Tobacco Use   • Smoking status: Every Day     Packs/day: 1 00     Types: Cigarettes   • Smokeless tobacco: Never   Vaping Use   • Vaping Use: Never used   Substance Use Topics   • Alcohol use: Never   • Drug use: Yes     Types: Heroin, Cocaine     Comment: pt states infrequent use        Review of Systems   Constitutional: Negative for chills and fever  HENT: Negative for ear pain and sore throat  Eyes: Negative for pain and visual disturbance  Respiratory: Negative for cough and shortness of breath  Cardiovascular: Negative for chest pain and palpitations  Gastrointestinal: Negative for abdominal pain, constipation, diarrhea and vomiting  Genitourinary: Negative for dysuria and hematuria  Musculoskeletal: Negative for arthralgias and back pain     Skin: Negative for color change and rash  Neurological: Negative for seizures, syncope and light-headedness  Psychiatric/Behavioral: Negative for agitation and confusion  Physical Exam  ED Triage Vitals [11/27/22 0910]   Temperature Pulse Respirations Blood Pressure SpO2   97 6 °F (36 4 °C) 97 16 154/85 94 %      Temp Source Heart Rate Source Patient Position - Orthostatic VS BP Location FiO2 (%)   Tympanic Monitor Sitting Right arm --      Pain Score       No Pain             Orthostatic Vital Signs  Vitals:    11/27/22 1224 11/27/22 1333 11/27/22 1400 11/27/22 1500   BP: 140/70 161/80 140/66 134/70   Pulse: 76 80 64 76   Patient Position - Orthostatic VS: Sitting          Physical Exam  Vitals and nursing note reviewed  Constitutional:       General: He is not in acute distress  Appearance: Normal appearance  He is well-developed  HENT:      Head: Normocephalic and atraumatic  Right Ear: External ear normal       Left Ear: External ear normal    Eyes:      Extraocular Movements: Extraocular movements intact  Pupils: Pupils are equal, round, and reactive to light  Cardiovascular:      Rate and Rhythm: Normal rate and regular rhythm  Pulmonary:      Effort: Pulmonary effort is normal  No respiratory distress  Breath sounds: Normal breath sounds  Abdominal:      Palpations: Abdomen is soft  Tenderness: There is no abdominal tenderness  There is no guarding or rebound  Musculoskeletal:         General: No swelling or tenderness  Normal range of motion  Cervical back: Normal range of motion and neck supple  Skin:     General: Skin is warm and dry  Neurological:      General: No focal deficit present  Mental Status: He is alert and oriented to person, place, and time  Sensory: No sensory deficit  Motor: No weakness     Psychiatric:         Mood and Affect: Mood normal          Behavior: Behavior normal          ED Medications  Medications   naloxone (NARCAN) injection 0 5 mg (0 mg Intravenous Hold 11/27/22 5948)   naloxone nasal- Given to patient by provider at discharge  (NARCAN) 4 mg/0 1 mL nasal spray 4 mg (4 mg Does not apply Given by Other 11/27/22 3881)       Diagnostic Studies  Results Reviewed     Procedure Component Value Units Date/Time    FLU/RSV/COVID - if FLU/RSV clinically relevant [[id number]]  (Normal) Collected: 11/27/22 1224    Lab Status: Final result Specimen: Nares from Nose Updated: 11/27/22 1316     SARS-CoV-2 Negative     INFLUENZA A PCR Negative     INFLUENZA B PCR Negative     RSV PCR Negative    Narrative:      FOR PEDIATRIC PATIENTS - copy/paste COVID Guidelines URL to browser: [email or web address]  Veoh    SARS-CoV-2 assay is a Nucleic Acid Amplification assay intended for the  qualitative detection of nucleic acid from SARS-CoV-2 in nasopharyngeal  swabs  Results are for the presumptive identification of SARS-CoV-2 RNA  Positive results are indicative of infection with SARS-CoV-2, the virus  causing COVID-19, but do not rule out bacterial infection or co-infection  with other viruses  Laboratories within the [place] and its  territories are required to report all positive results to the appropriate  public health authorities  Negative results do not preclude SARS-CoV-2  infection and should not be used as the sole basis for treatment or other  patient management decisions  Negative results must be combined with  clinical observations, patient history, and epidemiological information  This test has not been FDA cleared or approved  This test has been authorized by FDA under an Emergency Use Authorization  (EUA)   This test is only authorized for the duration of time the  declaration that circumstances exist justifying the authorization of the  emergency use of an in vitro diagnostic tests for detection of SARS-CoV-2  virus and/or diagnosis of COVID-19 infection under section 564(b)(1) of  the Act, 21 U  S C  243ELE-1(B)(2), unless the authorization is terminated  or revoked sooner  The test has been validated but independent review by FDA  and CLIA is pending  Test performed using [employer] GeneXpert: This RT-PCR assay targets N2,  a region unique to SARS-CoV-2  A conserved region in the E-gene was chosen  for pan-Sarbecovirus detection which includes SARS-CoV-2  According to CMS-2020-01-R, this platform meets the definition of high-throughput technology  POCT alcohol breath test [[id number]]  (Normal) Resulted: 11/27/22 1225    Lab Status: Final result Updated: 11/27/22 1225     EXTBreath Alcohol 0 000    Rapid drug screen, urine [[id number]]     Lab Status: No result Specimen: Urine                  No orders to display         Procedures  Procedures      ED Course  ED Course as of 11/27/22 1542   Sun Nov 27, 2022   1008 Medically cleared for [address] evaluation  1230 Patient to go to [facility] rehab  Patient's wife concerned that he is using drugs in the bathroom  Patient is slightly sleepier than on initial arrival  Patient changed into paper scrubs and his personal belongings given to his wife  No evidence of respiratory depression or hypoxia  SBIRT 20yo+    Flowsheet Row Most Recent Value   SBIRT (25 yo +)    In order to provide better care to our patients, we are screening all of our patients for alcohol and drug use  Would it be okay to ask you these screening questions? Yes Filed at: 11/27/2022 9119   Initial Alcohol Screen: US AUDIT-C     1  How often do you have a drink containing alcohol? 1 Filed at: 11/27/2022 0917   2  How many drinks containing alcohol do you have on a typical day you are drinking? 3 Filed at: 11/27/2022 0917   3a  Male UNDER 65: How often do you have five or more drinks on one occasion? 1 Filed at: 11/27/2022 0917   Audit-C Score 5 Filed at: 11/27/2022 0323   NATASHA: How many times in the past year have you        Used an illegal drug or used a prescription medication for non-medical reasons? Once or Twice Filed at: 11/27/2022 0453   DAST-10: In the past 12 months       1  Have you used drugs other than those required for medical reasons? 1 Filed at: 11/27/2022 0917   2  Do you use more than one drug at a time? 1 Filed at: 11/27/2022 0917   3  Have you had medical problems as a result of your drug use (e g , memory loss, hepatitis, convulsions, bleeding, etc )? 0 Filed at: 11/27/2022 0917   4  Have you had "blackouts" or "flashbacks" as a result of drug use? YesNo 1 Filed at: 11/27/2022 0917   5  Do you ever feel bad or guilty about your drug use? 1 Filed at: 11/27/2022 0917   6  Does your spouse (or parent) ever complain about your involvement with drugs? 1 Filed at: 11/27/2022 0917   7  Have you neglected your family because of your use of drugs? 0 Filed at: 11/27/2022 0917   8  Have you engaged in illegal activities in order to obtain drugs? 0 Filed at: 11/27/2022 0917   9  Have you ever experienced withdrawal symptoms (felt sick) when you stopped taking drugs? 1 Filed at: 11/27/2022 0917   10  Are you always able to stop using drugs when you want to? 1 Filed at: 11/27/2022 6690   DAST-10 Score 7 Filed at: 11/27/2022 7888                MDM  Number of Diagnoses or Management Options  Accidental overdose of heroin [facility]): new and does not require workup  Diagnosis management comments: Presents after accidental heroin overdose  Patient was given 2 doses of intranasal narcan and returned to consciousness  He has no complaints on arrival and is feeling well  Family arrived and convinced him to go directly to rehab  Patient made recurrent visits to bathroom to supposedly provide urine specimen  His wife was concerned that he was become more sleepy after his visit to the bathroom and was suspicious for possible heroin use in the bathroom  He was changed into paper scrubs and personal items given to his wife    Patient will go to [facility] for rehab   Daughter provided transportation  Narcan given to wife and instructed on use  Patient in agreement with plan and questions were answered  Verbalized understanding of return precautions  Portions or all of this note were generated using voice recognition software  Occasional wrong word or "sound a like" substitutions may have occurred due to the inherent limitations of voice recognition software  Please interpret any errors within the intended context of the whole sentence or idea  Disposition  Final diagnoses:   Accidental overdose of heroin [facility])     Time reflects when diagnosis was documented in both MDM as applicable and the Disposition within this note     Time User Action Codes Description Comment    11/27/2022  9:30 AM Sofia Calvert Add [T40 1X1A] Accidental overdose of heroin, initial [facility] )     11/27/2022  9:30 AM Sofia Calvert Remove [T40 1X1A] Accidental overdose of heroin, initial encounter ([facility] )     11/27/2022  9:30 AM Sofia Calvert Add [T40 1X1A] Accidental overdose of heroin [facility])       ED Disposition     ED Disposition   Discharge    Condition   Stable    Date/Time   Sun Nov 27, 2022  3:09 PM    Comment   Krupa Balbuena should be transferred out to rehab and has been medically cleared  Follow-up Information     Follow up With Specialties Details Why Contact Info Additional [facility] Medicine Call in 3 days  [address], [address] [phone]  [address] [facility] [address] [facility] [address], [address], [address] [place], [place], [address]          There are no discharge medications for this patient  PDMP Review     None           ED Provider  Attending physically available and evaluated Murel Orts  I managed the patient along with the ED Attending      Electronically Signed by         Gabriella Marinelli MD  11/27/22 [address]

## 2022-11-27 NOTE — ED NOTES
Richard from [facility] called and stated that Pt is accepted to [facility] and that they are working on a transport time  He stated that he will call when he knows

## 2022-11-27 NOTE — ED NOTES
Richard from [facility] called and asked if Pt can call [facility] to do pre-screen for Detox bed that Pt has agreed to  Phone number is [phone] option 2 then option 1  [facility] gave information to ED Rn and asked if Pt can use portable to call them

## 2022-11-27 NOTE — ED NOTES
Patient provided with phone to speak with [employer] from [facility], RN  11/27/22 9891

## 2022-11-27 NOTE — ED NOTES
Spoke with Jitendra Fletcher from [facility] who states that family is allowed to bring patient to [facility]  Spoke with daughter who prefer that method of transport rather than waiting many hours for patient to be transported meena Morgan RN  11/27/22 2736

## 2022-11-27 NOTE — ED NOTES
Crisis Intervention Specialist (CIS) called HOST and spoke to [facility] as patient (Pt) is requesting rehab  CW then faxed chart and facesheet per Richard's request to [phone]

## 2022-11-27 NOTE — ED NOTES
Late entry due to patient care  Patient more lethargic from baseline with period of decreased respiration following 2 lengthy trips to the restroom - patient in room and easily rousable at this time  Patient placed on monitor and changed into [address] [facility] attire  Completed intake call and accepted to [facility], transport time pending        Cydney Gomez, RN  11/27/22 1577

## 2022-12-02 NOTE — ED ATTENDING ATTESTATION
11/27/2022  IRacquel MD, saw and evaluated the patient  I have discussed the patient with the resident/non-physician practitioner and agree with the resident's/non-physician practitioner's findings, Plan of Care, and MDM as documented in the resident's/non-physician practitioner's note, except where noted  All available labs and Radiology studies were reviewed  I was present for key portions of any procedure(s) performed by the resident/non-physician practitioner and I was immediately available to provide assistance  At this point I agree with the current assessment done in the Emergency Department  I have conducted an independent evaluation of this patient a history and physical is as follows:    ED Course     64-year-old male presents after accidental heroin overdose  Received Narcan EN route prior to arrival with improvement of symptoms  Patient denies SI HI  VH  Patient requesting help with detox  [facility] for placement    Patit excepted the pyramid will discharge to pyramid family to take patient directly to facility      Critical Care Time  Procedures